# Patient Record
Sex: MALE | Race: WHITE | NOT HISPANIC OR LATINO | URBAN - METROPOLITAN AREA
[De-identification: names, ages, dates, MRNs, and addresses within clinical notes are randomized per-mention and may not be internally consistent; named-entity substitution may affect disease eponyms.]

---

## 2017-10-20 ENCOUNTER — APPOINTMENT (RX ONLY)
Dept: URBAN - METROPOLITAN AREA CLINIC 44 | Facility: CLINIC | Age: 22
Setting detail: DERMATOLOGY
End: 2017-10-20

## 2017-10-20 ENCOUNTER — APPOINTMENT (RX ONLY)
Dept: URBAN - METROPOLITAN AREA CLINIC 45 | Facility: CLINIC | Age: 22
Setting detail: DERMATOLOGY
End: 2017-10-20

## 2017-10-20 DIAGNOSIS — L72.0 EPIDERMAL CYST: ICD-10-CM

## 2017-10-20 PROBLEM — L20.84 INTRINSIC (ALLERGIC) ECZEMA: Status: ACTIVE | Noted: 2017-10-20

## 2017-10-20 PROBLEM — L70.0 ACNE VULGARIS: Status: ACTIVE | Noted: 2017-10-20

## 2017-10-20 PROCEDURE — ? INCISION AND DRAINAGE

## 2017-10-20 PROCEDURE — 10060 I&D ABSCESS SIMPLE/SINGLE: CPT

## 2017-10-20 PROCEDURE — ? COUNSELING

## 2017-10-20 ASSESSMENT — LOCATION DETAILED DESCRIPTION DERM
LOCATION DETAILED: LEFT POSTERIOR SHOULDER
LOCATION DETAILED: LEFT POSTERIOR SHOULDER

## 2017-10-20 ASSESSMENT — LOCATION SIMPLE DESCRIPTION DERM
LOCATION SIMPLE: LEFT SHOULDER
LOCATION SIMPLE: LEFT SHOULDER

## 2017-10-20 ASSESSMENT — LOCATION ZONE DERM
LOCATION ZONE: ARM
LOCATION ZONE: ARM

## 2017-10-20 NOTE — PROCEDURE: INCISION AND DRAINAGE
Curette: No
Curette Text (Optional): After the contents were expressed a curette was used to partially remove the cyst wall.
Drainage Type?: cyst-like
Drainage Amount?: moderate
Method: 11 blade
Post-Care Instructions: I reviewed with the patient in detail post-care instructions. Patient should keep wound covered and call the office should any redness, pain, swelling or worsening occur.
Anesthesia Volume In Cc: 1
Anesthesia Type: 1% lidocaine with epinephrine
Dressing: dry sterile dressing
Epidermal Closure: simple interrupted
Size Of Lesion In Cm (Optional But May Be Required For Some Insurances): 1.1
Epidermal Sutures: 4-0 Ethilon
Consent was obtained and risks were reviewed including but not limited to delayed wound healing, infection, need for multiple I and D's, and pain.
Preparation Text: The area was prepped in the usual clean fashion.
Lesion Type: Cyst
Suture Text: The incision was partially closed with
Detail Level: Detailed
Wound Care: Vaseline

## 2017-10-20 NOTE — HPI: SUBCUTANEOUS NODULE
Has Your Skin Lesion Been Treated?: not been treated
Is This A New Presentation, Or A Follow-Up?: Subcutaneous Nodule
Additional History: Patient states that he has had what he believes to be a cyst in the same location as a growth that was removed roughly 3 years ago

## 2017-10-20 NOTE — PROCEDURE: MIPS QUALITY
Quality 110: Preventive Care And Screening: Influenza Immunization: Influenza Immunization not Administered for Documented Reasons.
Detail Level: Detailed
Quality 431: Preventive Care And Screening: Unhealthy Alcohol Use - Screening: Patient screened for unhealthy alcohol use using a single question and scores less than 2 times per year
Quality 226: Preventive Care And Screening: Tobacco Use: Screening And Cessation Intervention: Patient screened for tobacco and is a smoker AND received Cessation Counseling
Quality 130: Documentation Of Current Medications In The Medical Record: Current Medications Documented

## 2017-10-20 NOTE — PROCEDURE: MIPS QUALITY
Quality 226: Preventive Care And Screening: Tobacco Use: Screening And Cessation Intervention: Patient screened for tobacco and is a smoker AND received Cessation Counseling
Detail Level: Detailed
Quality 431: Preventive Care And Screening: Unhealthy Alcohol Use - Screening: Patient screened for unhealthy alcohol use using a single question and scores less than 2 times per year
Quality 110: Preventive Care And Screening: Influenza Immunization: Influenza Immunization not Administered for Documented Reasons.
Quality 130: Documentation Of Current Medications In The Medical Record: Current Medications Documented

## 2017-10-20 NOTE — PROCEDURE: INCISION AND DRAINAGE
Epidermal Sutures: 4-0 Ethilon
Detail Level: Detailed
Drainage Amount?: moderate
Epidermal Closure: simple interrupted
Suture Text: The incision was partially closed with
Curette: No
Wound Care: Vaseline
Anesthesia Volume In Cc: 1
Dressing: dry sterile dressing
Lesion Type: Cyst
Method: 11 blade
Consent was obtained and risks were reviewed including but not limited to delayed wound healing, infection, need for multiple I and D's, and pain.
Curette Text (Optional): After the contents were expressed a curette was used to partially remove the cyst wall.
Anesthesia Type: 1% lidocaine with epinephrine
Drainage Type?: cyst-like
Post-Care Instructions: I reviewed with the patient in detail post-care instructions. Patient should keep wound covered and call the office should any redness, pain, swelling or worsening occur.
Size Of Lesion In Cm (Optional But May Be Required For Some Insurances): 1.1
Preparation Text: The area was prepped in the usual clean fashion.